# Patient Record
Sex: MALE | Employment: OTHER | URBAN - METROPOLITAN AREA
[De-identification: names, ages, dates, MRNs, and addresses within clinical notes are randomized per-mention and may not be internally consistent; named-entity substitution may affect disease eponyms.]

---

## 2019-12-16 ENCOUNTER — OFFICE VISIT (OUTPATIENT)
Dept: FAMILY MEDICINE CLINIC | Age: 44
End: 2019-12-16

## 2019-12-16 VITALS
WEIGHT: 226.4 LBS | OXYGEN SATURATION: 97 % | DIASTOLIC BLOOD PRESSURE: 76 MMHG | BODY MASS INDEX: 33.53 KG/M2 | SYSTOLIC BLOOD PRESSURE: 122 MMHG | HEART RATE: 81 BPM | RESPIRATION RATE: 16 BRPM | TEMPERATURE: 98.2 F | HEIGHT: 69 IN

## 2019-12-16 DIAGNOSIS — E78.2 MIXED HYPERLIPIDEMIA: ICD-10-CM

## 2019-12-16 DIAGNOSIS — E11.649 UNCONTROLLED TYPE 2 DIABETES MELLITUS WITH HYPOGLYCEMIA WITHOUT COMA (HCC): Primary | ICD-10-CM

## 2019-12-16 RX ORDER — ASPIRIN 81 MG/1
TABLET ORAL DAILY
COMMUNITY

## 2019-12-16 NOTE — PROGRESS NOTES
Pt is here to establish care. Diabetes    1. Have you been to the ER, urgent care clinic since your last visit? Hospitalized since your last visit? No    2. Have you seen or consulted any other health care providers outside of the [address] [place] [address] since your last visit? Include any pap smears or colon screening.  Yes [facility] for psych 9/19

## 2019-12-16 NOTE — PROGRESS NOTES
HISTORY OF PRESENT ILLNESS  Lyle Bowen is a 38 y.o. male. Patient presents to [facility]. HPI  [employer] 20 years retired. Had labs done about 3 months. HgbA1C was over 9.0  Patient checks glucose 3-4 times a day. Pt wants a ref to endocrine so he can get a Denny meter. Review of Systems   Constitutional: Negative. Cardiovascular: Negative. Psychiatric/Behavioral: Negative. Visit Vitals  /76 (BP 1 Location: Left arm)   Pulse 81   Temp 98.2 °F (36.8 °C) (Oral)   Resp 16   Ht 5' 9\" (1.753 m)   Wt 226 lb 6.4 oz (102.7 kg)   SpO2 97%   BMI 33.43 kg/m²       Physical Exam  Constitutional:       General: He is not in acute distress. Appearance: Normal appearance. He is not ill-appearing. Cardiovascular:      Rate and Rhythm: Normal rate and regular rhythm. Heart sounds: No murmur. Pulmonary:      Effort: Pulmonary effort is normal. No respiratory distress. Breath sounds: Normal breath sounds. No stridor. No wheezing, rhonchi or rales. Neurological:      Mental Status: He is alert and oriented to person, place, and time. Psychiatric:         Mood and Affect: Mood normal.         Behavior: Behavior normal.         Thought Content: Thought content normal.         Judgment: Judgment normal.         ASSESSMENT and PLAN    ICD-10-CM ICD-9-CM    1. Uncontrolled type 2 diabetes mellitus with hypoglycemia without coma ([facility] E11.649 250.82 SITagliptin-metFORMIN (JANUMET) 50-1,000 mg per tablet     251.2 flash glucose scanning reader (FREESTYLE DENNY 14 DAY READER) misc      flash glucose sensor (FREESTYLE DENNY 14 DAY SENSOR) kit   2. Mixed hyperlipidemia E78.2 272.2      PLAN:  We discussed his diabetes. The [employer] meter was ordered, Pt understands this may not be covered. We discussed endocrine ref. I can give him one or I can manage his diabetes.     Pt use to have [employer]ista and was switched by the [facility] to Zeis [employer]a all because of endocrine request.    I have discussed the diagnosis with the patient and the intended plan as seen in the above orders. The patient has received an after-visit summary and questions were answered concerning future plans. I have discussed medication side effects and warnings with the patient as well. Patient will call for further questions. Follow-up and Dispositions    · Return in about 3 months (around 3/16/2020) for chronic care.